# Patient Record
Sex: MALE | Race: WHITE
[De-identification: names, ages, dates, MRNs, and addresses within clinical notes are randomized per-mention and may not be internally consistent; named-entity substitution may affect disease eponyms.]

---

## 2017-04-30 ENCOUNTER — HOSPITAL ENCOUNTER (OUTPATIENT)
Dept: HOSPITAL 45 - C.LAB | Age: 22
Discharge: HOME | End: 2017-04-30
Attending: FAMILY MEDICINE
Payer: COMMERCIAL

## 2017-04-30 DIAGNOSIS — R10.9: ICD-10-CM

## 2017-04-30 DIAGNOSIS — R63.4: ICD-10-CM

## 2017-04-30 DIAGNOSIS — R63.4: Primary | ICD-10-CM

## 2017-04-30 DIAGNOSIS — R19.7: ICD-10-CM

## 2017-04-30 DIAGNOSIS — R11.0: ICD-10-CM

## 2017-04-30 DIAGNOSIS — R10.9: Primary | ICD-10-CM

## 2017-04-30 LAB
ALBUMIN/GLOB SERPL: 1.4 {RATIO} (ref 0.9–2)
ALP SERPL-CCNC: 59 U/L (ref 45–117)
ALT SERPL-CCNC: 21 U/L (ref 12–78)
ANION GAP SERPL CALC-SCNC: 5 MMOL/L (ref 3–11)
AST SERPL-CCNC: 16 U/L (ref 15–37)
BASOPHILS # BLD: 0.03 K/UL (ref 0–0.2)
BASOPHILS NFR BLD: 0.7 %
BUN SERPL-MCNC: 12 MG/DL (ref 7–18)
BUN/CREAT SERPL: 11 (ref 10–20)
CALCIUM SERPL-MCNC: 9.6 MG/DL (ref 8.5–10.1)
CHLORIDE SERPL-SCNC: 105 MMOL/L (ref 98–107)
CO2 SERPL-SCNC: 32 MMOL/L (ref 21–32)
COMPLETE: YES
CREAT SERPL-MCNC: 1.1 MG/DL (ref 0.6–1.4)
EOSINOPHIL NFR BLD AUTO: 246 K/UL (ref 130–400)
GLOBULIN SER-MCNC: 3.1 GM/DL (ref 2.5–4)
GLUCOSE SERPL-MCNC: 94 MG/DL (ref 70–99)
HCT VFR BLD CALC: 46.4 % (ref 42–52)
IG%: 0 %
IMM GRANULOCYTES NFR BLD AUTO: 37.7 %
LYMPHOCYTES # BLD: 1.61 K/UL (ref 1.2–3.4)
MCH RBC QN AUTO: 33.3 PG (ref 25–34)
MCHC RBC AUTO-ENTMCNC: 35.8 G/DL (ref 32–36)
MCV RBC AUTO: 93.2 FL (ref 80–100)
MONOCYTES NFR BLD: 11.5 %
NEUTROPHILS # BLD AUTO: 2.1 %
NEUTROPHILS NFR BLD AUTO: 48 %
PMV BLD AUTO: 9.5 FL (ref 7.4–10.4)
POTASSIUM SERPL-SCNC: 4.3 MMOL/L (ref 3.5–5.1)
RBC # BLD AUTO: 4.98 M/UL (ref 4.7–6.1)
SODIUM SERPL-SCNC: 142 MMOL/L (ref 136–145)
WBC # BLD AUTO: 4.27 K/UL (ref 4.8–10.8)

## 2017-05-01 ENCOUNTER — HOSPITAL ENCOUNTER (EMERGENCY)
Dept: HOSPITAL 45 - C.EDB | Age: 22
LOS: 1 days | Discharge: HOME | End: 2017-05-02
Payer: COMMERCIAL

## 2017-05-01 VITALS
WEIGHT: 163.36 LBS | WEIGHT: 163.36 LBS | BODY MASS INDEX: 23.39 KG/M2 | HEIGHT: 70 IN | HEIGHT: 70 IN | BODY MASS INDEX: 23.39 KG/M2

## 2017-05-01 VITALS — TEMPERATURE: 98.24 F

## 2017-05-01 DIAGNOSIS — R10.31: Primary | ICD-10-CM

## 2017-05-01 DIAGNOSIS — Z80.9: ICD-10-CM

## 2017-05-01 DIAGNOSIS — Z83.3: ICD-10-CM

## 2017-05-01 DIAGNOSIS — F90.9: ICD-10-CM

## 2017-05-01 DIAGNOSIS — Z82.49: ICD-10-CM

## 2017-05-01 LAB
APPEARANCE UR: CLEAR
BASOPHILS # BLD: 0.03 K/UL (ref 0–0.2)
BASOPHILS NFR BLD: 0.5 %
BILIRUB UR-MCNC: (no result) MG/DL
COLOR UR: YELLOW
COMPLETE: YES
EOSINOPHIL NFR BLD AUTO: 258 K/UL (ref 130–400)
HCT VFR BLD CALC: 47 % (ref 42–52)
IG%: 0 %
IMM GRANULOCYTES NFR BLD AUTO: 40 %
LYMPHOCYTES # BLD: 2.31 K/UL (ref 1.2–3.4)
MANUAL MICROSCOPIC REQUIRED?: NO
MCH RBC QN AUTO: 33.9 PG (ref 25–34)
MCHC RBC AUTO-ENTMCNC: 36.6 G/DL (ref 32–36)
MCV RBC AUTO: 92.7 FL (ref 80–100)
MONOCYTES NFR BLD: 6.9 %
NEUTROPHILS # BLD AUTO: 1.2 %
NEUTROPHILS NFR BLD AUTO: 51.4 %
NITRITE UR QL STRIP: (no result)
PH UR STRIP: 7 [PH] (ref 4.5–7.5)
PMV BLD AUTO: 9.8 FL (ref 7.4–10.4)
RBC # BLD AUTO: 5.07 M/UL (ref 4.7–6.1)
REVIEW REQ?: NO
SP GR UR STRIP: 1.01 (ref 1–1.03)
URINE BILL WITH OR WITHOUT MIC: (no result)
UROBILINOGEN UR-MCNC: (no result) MG/DL
WBC # BLD AUTO: 5.78 K/UL (ref 4.8–10.8)
ZZUR CULT IF INDIC CLEAN CATCH: NO

## 2017-05-02 VITALS — OXYGEN SATURATION: 97 % | HEART RATE: 74 BPM | DIASTOLIC BLOOD PRESSURE: 71 MMHG | SYSTOLIC BLOOD PRESSURE: 125 MMHG

## 2017-05-02 LAB
ALBUMIN/GLOB SERPL: 1.3 {RATIO} (ref 0.9–2)
ALP SERPL-CCNC: 61 U/L (ref 45–117)
ALT SERPL-CCNC: 19 U/L (ref 12–78)
ANION GAP SERPL CALC-SCNC: 7 MMOL/L (ref 3–11)
AST SERPL-CCNC: 11 U/L (ref 15–37)
BUN SERPL-MCNC: 11 MG/DL (ref 7–18)
BUN/CREAT SERPL: 10.3 (ref 10–20)
CALCIUM SERPL-MCNC: 9.4 MG/DL (ref 8.5–10.1)
CHLORIDE SERPL-SCNC: 104 MMOL/L (ref 98–107)
CO2 SERPL-SCNC: 30 MMOL/L (ref 21–32)
CREAT CL PREDICTED SERPL C-G-VRATE: 110.6 ML/MIN
CREAT SERPL-MCNC: 1.1 MG/DL (ref 0.6–1.4)
DEPRECATED C NEOFORM AG TITR SER LA: NOT DETECTED {TITER}
GLOBULIN SER-MCNC: 3.3 GM/DL (ref 2.5–4)
GLUCOSE SERPL-MCNC: 90 MG/DL (ref 70–99)
O&P GIARDIA AG: NOT DETECTED
POTASSIUM SERPL-SCNC: 3.7 MMOL/L (ref 3.5–5.1)
SODIUM SERPL-SCNC: 141 MMOL/L (ref 136–145)

## 2017-05-02 NOTE — DIAGNOSTIC IMAGING REPORT
CT ABD/PELVIS IV AND ORAL CONT



CLINICAL HISTORY: Right-sided abdominal pain. Nausea, diarrhea.    



COMPARISON STUDY:  11/8/2016



TECHNIQUE: Following the IV administration of 93 mL of Optiray-320, CT scan of

the abdomen and pelvis was performed from the lung bases to the proximal femurs.

Images are reviewed in the axial, sagittal, and coronal planes. IV contrast was

administered without complication.



CT DOSE: 265.27 mGy.cm



FINDINGS:



Lower chest: The heart is normal in size and configuration, without pericardial

effusion. The lung bases and pleural spaces are clear.



Liver: The contrast-enhanced liver is normal in size, contour, and attenuation.

There is no intrahepatic biliary ductal dilatation. The hepatic veins and portal

veins are patent.



Gallbladder: Unremarkable.



Spleen: Normal in size and attenuation.



Pancreas: Unremarkable.



Adrenal glands: Unremarkable.



Kidneys: There is symmetric renal cortical enhancement. The kidneys are normal

in size without hydronephrosis.



Bowel: There are no transition zones indicate bowel obstruction. The appendix

appears normal as visualized. There is no acute diverticulitis.



Peritoneum: There is no intraperitoneal free air or abdominal ascites.



Vasculature: The abdominal aorta is normal in course and caliber.



Adenopathy: None.



Pelvic viscera: The bladder, and pelvic viscera are unremarkable.



Skeletal structures: No destructive osseous lesions are seen.



IMPRESSION:  

1. No acute intra-abdominal or pelvic findings

2. No evidence of bowel obstruction. No evidence of free air

3. Normal appendix. No evidence of acute diverticulitis.







Electronically signed by:  Nain Craig M.D.

5/2/2017 6:31 AM



Dictated Date/Time:  5/2/2017 6:29 AM

## 2017-05-02 NOTE — EMERGENCY ROOM VISIT NOTE
History


First contact with patient:  23:10


Chief Complaint:  ABDOMINAL PAIN


Stated Complaint:  PAIN IN ABDOMEN, FEELING SICK AND NAUSEA


Nursing Triage Summary:  


Patient reports a two week history of nausea, one week history of lower 


abdominal pain, worse with palpationn and a two day history of associated 


diarrhea.  patient denies any fever/chills.  He reports pain is worse post 


prandal and worse with different types of foods.  Negative murphys sign.  Pain 


right lower quadrant.  Bowel sounds WNL.





History of Present Illness


The patient is a 21 year old male who presents to the Emergency Room with 

complaints of abdominal pain, nausea and diarrhea over the past one week.  The 

patient states that he has had occasional right-sided pain.  He states that he 

has had diarrhea every time he eats.  The patient states that he has had a 

decreased appetite and has lost 7 pounds over the past several weeks.  The 

patient saw his primary care provider and states that he has a CT scan ordered 

to be done this Thursday.  He states he has had increasing pain today, 

prompting him to come here.  He rates the discomfort a 6/10.  He denies any 

history of abdominal surgery or abdominal issues.  He denies any blood in the 

stools, fevers, chest pain or shortness of breath.  He denies any urinary 

symptoms.





Review of Systems


A complete 10 point review of systems was reviewed with the patient with 

pertinent positives and negatives as per history of present illness. All else 

were negative.





Past Medical/Surgical History


Medical Problems:


(1) ADHD (attention deficit hyperactivity disorder)


Surgical Problems:


(1) H/O hernia repair








Family History





Diabetes mellitus


FH: cancer


FH: heart disease





Social History


Smoking Status:  Never Smoker


Marital Status:  single


Housing Status:  lives with family


Occupation Status:  student





Current/Historical Medications


No Active Prescriptions or Reported Meds





Allergies


Coded Allergies:  


     Atomoxetine (Verified  Allergy, Unknown, ., 5/1/17)





Physical Exam


Vital Signs











  Date Time  Temp Pulse Resp B/P Pulse Ox O2 Delivery O2 Flow Rate FiO2


 


5/2/17 03:42  74 20 125/71 97   


 


5/2/17 02:30  83 19 147/83 98 Room Air  


 


5/2/17 00:30  63 19 138/82 97 Room Air  


 


5/1/17 22:33 36.8 69 20 125/84 98 Room Air  











Physical Exam


VITALS: Vitals are noted on the nurse's note and reviewed by myself.  Vital 

signs stable.


GENERAL: This is a 21-year-old male, in no acute distress, nondiaphoretic, well-

developed well-nourished.


SKIN: Capillary reflex less than 2 seconds.


HEENT: Normocephalic.  PERRLA.  EOMI.  Nares patent.  Mucous membranes moist.  

Neck is supple without nuchal rigidity.


HEART: Regular rate and rhythm without murmurs gallops or rubs.


LUNGS: Clear to auscultation bilaterally without wheezes, rales or rhonchi.  


ABDOMEN: Positive bowel sounds x 4.  Soft, minimal tenderness to palpation over 

the right lower quadrant.  No guarding or rebound tenderness.


NEURO: Patient was alert and oriented to person place and time.





Medical Decision & Procedures


ER Provider


Diagnostic Interpretation:


CT ABDOMEN & PELVIS:





The liver, gallbladder, spleen, pancreas, and adrenal glands are unremarkable.


Kidneys are similar in size and enhancement, without hydronephrosis.


No evidence of bowel obstruction.  Normal visualized appendix.  No evidence of 

acute diverticulitis.


Urinary bladder and prostate unremarkable.


No evidence of acute osseous abnormality.





Radiologist:  Supriya Covington MD





Laboratory Results


5/1/17 22:57








Red Blood Count 5.07, Mean Corpuscular Volume 92.7, Mean Corpuscular Hemoglobin 

33.9, Mean Corpuscular Hemoglobin Concent 36.6, Mean Platelet Volume 9.8, 

Neutrophils (%) (Auto) 51.4, Lymphocytes (%) (Auto) 40.0, Monocytes (%) (Auto) 

6.9, Eosinophils (%) (Auto) 1.2, Basophils (%) (Auto) 0.5, Neutrophils # (Auto) 

2.97, Lymphocytes # (Auto) 2.31, Monocytes # (Auto) 0.40, Eosinophils # (Auto) 

0.07, Basophils # (Auto) 0.03





5/1/17 22:57

















Test


  5/1/17


00:00 5/1/17


22:57


 


Urine Color YELLOW  


 


Urine Appearance CLEAR (CLEAR)  


 


Urine pH 7.0 (4.5-7.5)  


 


Urine Specific Gravity


  1.011


(1.000-1.030) 


 


 


Urine Protein NEG (NEG)  


 


Urine Glucose (UA) NEG (NEG)  


 


Urine Ketones NEG (NEG)  


 


Urine Occult Blood NEG (NEG)  


 


Urine Nitrite NEG (NEG)  


 


Urine Bilirubin NEG (NEG)  


 


Urine Urobilinogen NEG (NEG)  


 


Urine Leukocyte Esterase NEG (NEG)  


 


White Blood Count


  


  5.78 K/uL


(4.8-10.8)


 


Red Blood Count


  


  5.07 M/uL


(4.7-6.1)


 


Hemoglobin


  


  17.2 g/dL


(14.0-18.0)


 


Hematocrit  47.0 % (42-52) 


 


Mean Corpuscular Volume


  


  92.7 fL


()


 


Mean Corpuscular Hemoglobin


  


  33.9 pg


(25-34)


 


Mean Corpuscular Hemoglobin


Concent 


  36.6 g/dl


(32-36)


 


Platelet Count


  


  258 K/uL


(130-400)


 


Mean Platelet Volume


  


  9.8 fL


(7.4-10.4)


 


Neutrophils (%) (Auto)  51.4 % 


 


Lymphocytes (%) (Auto)  40.0 % 


 


Monocytes (%) (Auto)  6.9 % 


 


Eosinophils (%) (Auto)  1.2 % 


 


Basophils (%) (Auto)  0.5 % 


 


Neutrophils # (Auto)


  


  2.97 K/uL


(1.4-6.5)


 


Lymphocytes # (Auto)


  


  2.31 K/uL


(1.2-3.4)


 


Monocytes # (Auto)


  


  0.40 K/uL


(0.11-0.59)


 


Eosinophils # (Auto)


  


  0.07 K/uL


(0-0.5)


 


Basophils # (Auto)


  


  0.03 K/uL


(0-0.2)


 


RDW Standard Deviation


  


  40.7 fL


(36.4-46.3)


 


RDW Coefficient of Variation


  


  12.0 %


(11.5-14.5)


 


Immature Granulocyte % (Auto)  0.0 % 


 


Immature Granulocyte # (Auto)


  


  0.00 K/uL


(0.00-0.02)


 


Anion Gap


  


  7.0 mmol/L


(3-11)


 


Est Creatinine Clear Calc


Drug Dose 


  110.6 ml/min 


 


 


Estimated GFR (


American) 


  111.4 


 


 


Estimated GFR (Non-


American 


  96.1 


 


 


BUN/Creatinine Ratio  10.3 (10-20) 


 


Calcium Level


  


  9.4 mg/dl


(8.5-10.1)


 


Total Bilirubin


  


  0.5 mg/dl


(0.2-1)


 


Aspartate Amino Transf


(AST/SGOT) 


  11 U/L (15-37) 


 


 


Alanine Aminotransferase


(ALT/SGPT) 


  19 U/L (12-78) 


 


 


Alkaline Phosphatase


  


  61 U/L


()


 


Total Protein


  


  7.6 gm/dl


(6.4-8.2)


 


Albumin


  


  4.3 gm/dl


(3.4-5.0)


 


Globulin


  


  3.3 gm/dl


(2.5-4.0)


 


Albumin/Globulin Ratio  1.3 (0.9-2) 


 


Lipase


  


  115 U/L


()











Medications Administered











 Medications


  (Trade)  Dose


 Ordered  Sig/Denia


 Route  Start Time


 Stop Time Status Last Admin


Dose Admin


 


 Sodium Chloride


  (Nss 1000ml)  1,000 ml @ 


 999 mls/hr  Q1H1M STAT


 IV  5/1/17 23:30


 5/2/17 00:30 DC 5/1/17 23:43


999 MLS/HR


 


 Ondansetron HCl


  (Zofran Inj)  4 mg  NOW  STAT


 IV  5/1/17 23:30


 5/1/17 23:31 DC 5/1/17 23:43


4 MG


 


 Ondansetron HCl


  (Zofran Inj)  4 mg  NOW  STAT


 IV  5/2/17 01:40


 5/2/17 01:41 DC 5/2/17 01:51


4 MG











ED Course


The patient was evaluated as above.  Labs were drawn and IV access was 

obtained.  





Patient was medicated with 1 L normal saline solution and 4 mg Zofran.





The patient had continued nausea and was given an additional 4 mg Zofran.





CT scan of the abdomen and pelvis with IV and oral contrast was performed and 

read by radiology as above. 





Discharge instructions were reviewed with the patient.  The patient verbalized 

understanding of my assessment and treatment plan and was discharged home in 

good condition.





Medical Decision


Differential diagnosis includes cholecystitis, pancreatitis, IBS, IBD, 

gastroenteritis,, among others.





The patient is a 21-year-old male who presents today complaining of 

intermittent abdominal pain, diarrhea and decreased appetite.  Exam reveals 

only minimal tenderness to palpation of the right lower quadrant.  Labs 

revealed no leukocytosis, anemia or concerning electrolyte abnormalities.  

Lipase was not elevated.  Urinalysis was not suggestive of infection.  CT scan 

was performed and showed no acute findings within the abdomen.  The patient was 

informed that this may represent IBS or another process within the abdomen, but 

I do feel he can be discharged home to follow-up with his primary care provider 

this week.  Conservative measures were discussed.





The patient's case was reviewed with Dr. Newby, ED attending physician, who 

agreed with my assessment and treatment plan.





Based on the patient's presentation and work up, I feel the patient is stable 

for outpatient treatment.  The patient was educated to return to the emergency 

department for any worsening of their current condition or new/concerning 

symptoms.  He will follow up with his PCP.





Impression





 Primary Impression:  


 Right lower quadrant abdominal pain





Departure Information


Dispostion


Home / Self-Care





Condition


GOOD





Prescriptions





No Active Prescriptions or Reported Meds





Referrals


Serg Lee M.D. (PCP)





Patient Instructions


[facility]





Additional Instructions





You have been treated in the Emergency Department your Abdominal Pain. 

Laboratory results and imaging studies have ruled out any emergent causes for 

your abdominal pain which would warrant admission or surgery. 





For pain control, you can use the following over-the-counter medicines (if >11 yo):





- Regular strength (325mg/tab) Tylenol (acetaminophen) 2 tabs every 4-6 hours 

as needed. Do not exceed 12 tablets in a 24 hour period. Avoid taking more than 

4 grams (4000 mg) of Tylenol per day. This includes any other sources of 

acetaminophen you may take on a regular basis.





- Regular strength (200 mg/tab) Advil (ibuprofen) 1-2 tabs every 4-6 hours as 

needed. Do not exceed a dose of 3200 mg per day.





Drink plenty of water and stay well hydrated. 





Call your primary care provider later today to schedule a follow-up appointment.





Return to the emergency department if your symptoms persist despite treatment 

plan outlined above or if the following symptoms occur: fevers, chills, 

worsening nausea/vomiting, blood in your stool or urine.

## 2018-03-06 ENCOUNTER — HOSPITAL ENCOUNTER (OUTPATIENT)
Dept: HOSPITAL 45 - C.ULTR | Age: 23
Discharge: HOME | End: 2018-03-06
Attending: FAMILY MEDICINE
Payer: COMMERCIAL

## 2018-03-06 DIAGNOSIS — K40.90: Primary | ICD-10-CM

## 2018-03-06 NOTE — DIAGNOSTIC IMAGING REPORT
LEFT INGUINAL ULTRASOUND



CLINICAL HISTORY: UNILATERAL INGUINAL HERNIA W/O OBSTRUCTION    



COMPARISON STUDY:  CT scan dated 5-17



FINDINGS: There is shadowing from a hernia mesh. No hernia is visualized

ultrasonographically.



IMPRESSION:  No ultrasonographic evidence of a left inguinal hernia. 









Electronically signed by:  Nain Craig M.D.

3/6/2018 8:05 AM



Dictated Date/Time:  3/6/2018 8:05 AM